# Patient Record
Sex: MALE | Race: OTHER | NOT HISPANIC OR LATINO | URBAN - METROPOLITAN AREA
[De-identification: names, ages, dates, MRNs, and addresses within clinical notes are randomized per-mention and may not be internally consistent; named-entity substitution may affect disease eponyms.]

---

## 2023-05-20 ENCOUNTER — EMERGENCY (EMERGENCY)
Facility: HOSPITAL | Age: 58
LOS: 0 days | Discharge: HOME | End: 2023-05-20
Attending: STUDENT IN AN ORGANIZED HEALTH CARE EDUCATION/TRAINING PROGRAM
Payer: COMMERCIAL

## 2023-05-20 VITALS
DIASTOLIC BLOOD PRESSURE: 80 MMHG | RESPIRATION RATE: 16 BRPM | SYSTOLIC BLOOD PRESSURE: 129 MMHG | HEART RATE: 54 BPM | OXYGEN SATURATION: 98 %

## 2023-05-20 VITALS
DIASTOLIC BLOOD PRESSURE: 84 MMHG | OXYGEN SATURATION: 99 % | SYSTOLIC BLOOD PRESSURE: 150 MMHG | RESPIRATION RATE: 18 BRPM | TEMPERATURE: 98 F | HEART RATE: 65 BPM

## 2023-05-20 DIAGNOSIS — S70.211A ABRASION, RIGHT HIP, INITIAL ENCOUNTER: ICD-10-CM

## 2023-05-20 DIAGNOSIS — F10.90 ALCOHOL USE, UNSPECIFIED, UNCOMPLICATED: ICD-10-CM

## 2023-05-20 DIAGNOSIS — S70.311A ABRASION, RIGHT THIGH, INITIAL ENCOUNTER: ICD-10-CM

## 2023-05-20 DIAGNOSIS — S01.01XA LACERATION WITHOUT FOREIGN BODY OF SCALP, INITIAL ENCOUNTER: ICD-10-CM
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
## 2023-05-20 DIAGNOSIS — S50.311A ABRASION OF RIGHT ELBOW, INITIAL ENCOUNTER: ICD-10-CM

## 2023-05-20 DIAGNOSIS — S50.312A ABRASION OF LEFT ELBOW, INITIAL ENCOUNTER: ICD-10-CM

## 2023-05-20 DIAGNOSIS — S01.81XA LACERATION WITHOUT FOREIGN BODY OF OTHER PART OF HEAD, INITIAL ENCOUNTER: ICD-10-CM
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
## 2023-05-20 DIAGNOSIS — S06.9XAA UNSPECIFIED INTRACRANIAL INJURY WITH LOSS OF CONSCIOUSNESS STATUS UNKNOWN, INITIAL ENCOUNTER: ICD-10-CM

## 2023-05-20 LAB
ALBUMIN SERPL ELPH-MCNC: 4.3 G/DL — SIGNIFICANT CHANGE UP (ref 3.5–5.2)
ALP SERPL-CCNC: 67 U/L — SIGNIFICANT CHANGE UP (ref 30–115)
ALT FLD-CCNC: 22 U/L — SIGNIFICANT CHANGE UP (ref 0–41)
ANION GAP SERPL CALC-SCNC: 11 MMOL/L — SIGNIFICANT CHANGE UP (ref 7–14)
APTT BLD: 31.7 SEC — SIGNIFICANT CHANGE UP (ref 27–39.2)
AST SERPL-CCNC: 34 U/L — SIGNIFICANT CHANGE UP (ref 0–41)
BASOPHILS # BLD AUTO: 0.05 K/UL — SIGNIFICANT CHANGE UP (ref 0–0.2)
BASOPHILS NFR BLD AUTO: 0.5 % — SIGNIFICANT CHANGE UP (ref 0–1)
BILIRUB SERPL-MCNC: 0.3 MG/DL — SIGNIFICANT CHANGE UP (ref 0.2–1.2)
BUN SERPL-MCNC: 13 MG/DL — SIGNIFICANT CHANGE UP (ref 10–20)
CALCIUM SERPL-MCNC: 9.1 MG/DL — SIGNIFICANT CHANGE UP (ref 8.4–10.5)
CHLORIDE SERPL-SCNC: 107 MMOL/L — SIGNIFICANT CHANGE UP (ref 98–110)
CO2 SERPL-SCNC: 27 MMOL/L — SIGNIFICANT CHANGE UP (ref 17–32)
CREAT SERPL-MCNC: 0.9 MG/DL — SIGNIFICANT CHANGE UP (ref 0.7–1.5)
EGFR: 101 ML/MIN/1.73M2 — SIGNIFICANT CHANGE UP
EOSINOPHIL # BLD AUTO: 0.08 K/UL — SIGNIFICANT CHANGE UP (ref 0–0.7)
EOSINOPHIL NFR BLD AUTO: 0.8 % — SIGNIFICANT CHANGE UP (ref 0–8)
ETHANOL SERPL-MCNC: 211 MG/DL — HIGH
GLUCOSE SERPL-MCNC: 101 MG/DL — HIGH (ref 70–99)
HCT VFR BLD CALC: 46.3 % — SIGNIFICANT CHANGE UP (ref 42–52)
HGB BLD-MCNC: 15.9 G/DL — SIGNIFICANT CHANGE UP (ref 14–18)
IMM GRANULOCYTES NFR BLD AUTO: 0.3 % — SIGNIFICANT CHANGE UP (ref 0.1–0.3)
INR BLD: 0.89 RATIO — SIGNIFICANT CHANGE UP (ref 0.65–1.3)
LACTATE SERPL-SCNC: 1.8 MMOL/L — SIGNIFICANT CHANGE UP (ref 0.7–2)
LIDOCAIN IGE QN: 23 U/L — SIGNIFICANT CHANGE UP (ref 7–60)
LYMPHOCYTES # BLD AUTO: 2.03 K/UL — SIGNIFICANT CHANGE UP (ref 1.2–3.4)
LYMPHOCYTES # BLD AUTO: 20.2 % — LOW (ref 20.5–51.1)
MCHC RBC-ENTMCNC: 32.9 PG — HIGH (ref 27–31)
MCHC RBC-ENTMCNC: 34.3 G/DL — SIGNIFICANT CHANGE UP (ref 32–37)
MCV RBC AUTO: 95.7 FL — HIGH (ref 80–94)
MONOCYTES # BLD AUTO: 0.8 K/UL — HIGH (ref 0.1–0.6)
MONOCYTES NFR BLD AUTO: 7.9 % — SIGNIFICANT CHANGE UP (ref 1.7–9.3)
NEUTROPHILS # BLD AUTO: 7.08 K/UL — HIGH (ref 1.4–6.5)
NEUTROPHILS NFR BLD AUTO: 70.3 % — SIGNIFICANT CHANGE UP (ref 42.2–75.2)
NRBC # BLD: 0 /100 WBCS — SIGNIFICANT CHANGE UP (ref 0–0)
PLATELET # BLD AUTO: 224 K/UL — SIGNIFICANT CHANGE UP (ref 130–400)
PMV BLD: 10.2 FL — SIGNIFICANT CHANGE UP (ref 7.4–10.4)
POTASSIUM SERPL-MCNC: 4 MMOL/L — SIGNIFICANT CHANGE UP (ref 3.5–5)
POTASSIUM SERPL-SCNC: 4 MMOL/L — SIGNIFICANT CHANGE UP (ref 3.5–5)
PROT SERPL-MCNC: 6.7 G/DL — SIGNIFICANT CHANGE UP (ref 6–8)
PROTHROM AB SERPL-ACNC: 10.1 SEC — SIGNIFICANT CHANGE UP (ref 9.95–12.87)
RBC # BLD: 4.84 M/UL — SIGNIFICANT CHANGE UP (ref 4.7–6.1)
RBC # FLD: 13 % — SIGNIFICANT CHANGE UP (ref 11.5–14.5)
SODIUM SERPL-SCNC: 145 MMOL/L — SIGNIFICANT CHANGE UP (ref 135–146)
TROPONIN T SERPL-MCNC: <0.01 NG/ML — SIGNIFICANT CHANGE UP
WBC # BLD: 10.07 K/UL — SIGNIFICANT CHANGE UP (ref 4.8–10.8)
WBC # FLD AUTO: 10.07 K/UL — SIGNIFICANT CHANGE UP (ref 4.8–10.8)

## 2023-05-20 PROCEDURE — 99285 EMERGENCY DEPT VISIT HI MDM: CPT

## 2023-05-20 PROCEDURE — 73070 X-RAY EXAM OF ELBOW: CPT | Mod: 50

## 2023-05-20 PROCEDURE — 99291 CRITICAL CARE FIRST HOUR: CPT | Mod: 25

## 2023-05-20 PROCEDURE — 72170 X-RAY EXAM OF PELVIS: CPT | Mod: 26

## 2023-05-20 PROCEDURE — 76705 ECHO EXAM OF ABDOMEN: CPT

## 2023-05-20 PROCEDURE — 85730 THROMBOPLASTIN TIME PARTIAL: CPT

## 2023-05-20 PROCEDURE — 93010 ELECTROCARDIOGRAM REPORT: CPT

## 2023-05-20 PROCEDURE — 84484 ASSAY OF TROPONIN QUANT: CPT

## 2023-05-20 PROCEDURE — 83605 ASSAY OF LACTIC ACID: CPT

## 2023-05-20 PROCEDURE — 70450 CT HEAD/BRAIN W/O DYE: CPT | Mod: 26,MA

## 2023-05-20 PROCEDURE — 70450 CT HEAD/BRAIN W/O DYE: CPT | Mod: MA

## 2023-05-20 PROCEDURE — 80307 DRUG TEST PRSMV CHEM ANLYZR: CPT

## 2023-05-20 PROCEDURE — 85610 PROTHROMBIN TIME: CPT

## 2023-05-20 PROCEDURE — 93005 ELECTROCARDIOGRAM TRACING: CPT

## 2023-05-20 PROCEDURE — 99053 MED SERV 10PM-8AM 24 HR FAC: CPT

## 2023-05-20 PROCEDURE — 99292 CRITICAL CARE ADDL 30 MIN: CPT

## 2023-05-20 PROCEDURE — 72170 X-RAY EXAM OF PELVIS: CPT

## 2023-05-20 PROCEDURE — 36415 COLL VENOUS BLD VENIPUNCTURE: CPT

## 2023-05-20 PROCEDURE — 99284 EMERGENCY DEPT VISIT MOD MDM: CPT

## 2023-05-20 PROCEDURE — 73070 X-RAY EXAM OF ELBOW: CPT | Mod: 26,50

## 2023-05-20 PROCEDURE — 71260 CT THORAX DX C+: CPT | Mod: 26,MA

## 2023-05-20 PROCEDURE — 85025 COMPLETE CBC W/AUTO DIFF WBC: CPT

## 2023-05-20 PROCEDURE — 72125 CT NECK SPINE W/O DYE: CPT | Mod: 26,MA

## 2023-05-20 PROCEDURE — 71045 X-RAY EXAM CHEST 1 VIEW: CPT

## 2023-05-20 PROCEDURE — 71045 X-RAY EXAM CHEST 1 VIEW: CPT | Mod: 26

## 2023-05-20 PROCEDURE — 74177 CT ABD & PELVIS W/CONTRAST: CPT | Mod: 26,MA

## 2023-05-20 PROCEDURE — 83690 ASSAY OF LIPASE: CPT

## 2023-05-20 PROCEDURE — 72125 CT NECK SPINE W/O DYE: CPT | Mod: MA

## 2023-05-20 PROCEDURE — 82962 GLUCOSE BLOOD TEST: CPT

## 2023-05-20 PROCEDURE — 71260 CT THORAX DX C+: CPT | Mod: MA

## 2023-05-20 PROCEDURE — 74177 CT ABD & PELVIS W/CONTRAST: CPT | Mod: MA

## 2023-05-20 PROCEDURE — 80053 COMPREHEN METABOLIC PANEL: CPT
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
## 2023-05-20 NOTE — ED PROVIDER NOTE - NSFOLLOWUPCLINICS_GEN_ALL_ED_FT
[facility]  Medicine  [address], [place]   Phone: [phone]  Fax:   Follow Up Time: 4-6 Days

## 2023-05-20 NOTE — ED ADULT NURSE NOTE - NSFALLRISKINTERV_ED_ALL_ED
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
## 2023-05-20 NOTE — ED PROVIDER NOTE - PHYSICAL EXAMINATION
PHYSICAL EXAM: I have reviewed current vital signs.  GENERAL: NAD, well-nourished; well-developed.  HEAD:  Normocephalic.  EYES: EOMI, PERRL, conjunctiva and sclera clear.  ENT: MMM, no erythema/exudates.  NECK: Supple, no JVD.  CHEST/LUNG: Clear to auscultation bilaterally; no wheezes, rales, or rhonchi.  HEART: Regular rate and rhythm, normal S1 and S2; no murmurs, rubs, or gallops.  ABDOMEN: Soft, nontender, nondistended.  EXTREMITIES:  2+ peripheral pulses; no clubbing, cyanosis, or edema.  PSYCH: Cooperative, appropriate, normal mood and affect.  NEUROLOGY: A&O x 3. Motor 5/5. Sensory intact. No focal neurological deficits.   SKIN: Abrasion on left forehead and right hip. Superficial laceration on left forehead.

## 2023-05-20 NOTE — ED PROVIDER NOTE - PATIENT PORTAL LINK FT
You can access the FollowMyHealth Patient Portal offered by [facility] by registering at the following website: [email or web address]. By joining [employer] [facility]’s FollowMyHealth portal, you will also be able to view your health information using other applications (apps) compatible with our system.
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
## 2023-05-20 NOTE — ED PROVIDER NOTE - CARE PROVIDER_API CALL
Obie Jones)  Critical Care Medicine; Internal Medicine; Pulmonary Disease; Sleep Medicine  [address] [place] [address]  Phone: [phone]  Fax: [phone]  Follow Up Time: Routine
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
## 2023-05-20 NOTE — ED ADULT NURSE NOTE - DRUG PRE-SCREENING (DAST -1)
Encounter Date: 6/12/2022    SCRIBE #1 NOTE: I, Leela Tuttle, am scribing for, and in the presence of,  Sarah Garcia DO. I have scribed the following portions of the note - the EKG reading. Other sections scribed: HPI, ROS, PE.       History     Chief Complaint   Patient presents with    Abdominal Pain     Pt to ER via [facility] for abd pain.  Upper abdomen.  Started 2 days ago.  Denies n/v, but is having diarrhea     72 year old female with history of HTN, DM, and arthritis presents to ED complaining of abdominal pain. Pt reports sharp constant abdominal pain and diarrhea that began 2 days ago.  She also reports rash under breast and bedsore on her bottom. Pt denies N/V, or fever.    The history is provided by the patient.   Abdominal Pain  The current episode started several days ago. The abdominal pain is generalized. The severity of the abdominal pain is 9/10. Exacerbated by: drinking water. The other symptoms of the illness include diarrhea. The other symptoms of the illness do not include fever, shortness of breath, nausea, vomiting or dysuria.   The diarrhea began 2 days ago.   Symptoms associated with the illness do not include chills, diaphoresis or hematuria.     Review of patient's allergies indicates:   Allergen Reactions    Adhesive Other (See Comments)     Removes her skin.      Morphine Other (See Comments)     Makes her crazy     Past Medical History:   Diagnosis Date    Age-related nuclear cataract of both eyes 5/26/2022    Anemia 5/26/2022    Benign essential HTN 12/27/2018    Chronic cholecystitis 5/26/2022    Class 3 severe obesity due to excess calories with serious comorbidity and body mass index (BMI) of 40.0 to 44.9 in adult 12/28/2018    Closed fracture of distal end of right femur 12/24/2018    Closed fracture of right hip requiring operative repair, with routine healing, subsequent encounter 12/28/2018    Coronary artery disease involving native coronary artery of native heart  without angina pectoris 5/26/2022    Diabetes mellitus type 2 in obese 12/27/2018    Diabetic polyneuropathy 5/26/2022    Erosive osteoarthritis 5/26/2022    Gastroesophageal reflux disease 5/26/2022    Hypertension 5/26/2022    Low vitamin D level 5/26/2022    Mixed hyperlipidemia 5/26/2022    Osteoporosis 12/28/2018    history of multiple fractures and arthritis history of multiple fractures and arthritis    Other hyperlipidemia 12/28/2018    Peripheral arterial disease 5/26/2022    Physical deconditioning 5/9/2020    Postmenopausal 5/26/2022    Seasonal allergic rhinitis 5/26/2022    STEMI (ST elevation myocardial infarction) 5/26/2022    Tobacco dependency 5/26/2022     No past surgical history on file.  No family history on file.     Review of Systems   Constitutional: Negative for chills, diaphoresis and fever.   HENT: Negative for congestion and sore throat.    Eyes: Negative for visual disturbance.   Respiratory: Negative for cough and shortness of breath.    Cardiovascular: Negative for chest pain and palpitations.   Gastrointestinal: Positive for abdominal pain and diarrhea. Negative for nausea and vomiting.   Genitourinary: Negative for dysuria and hematuria.   Skin: Negative for rash.   Neurological: Negative for syncope, weakness, numbness and headaches.   All other systems reviewed and are negative.      Physical Exam     Initial Vitals [06/12/22 1050]   BP Pulse Resp Temp SpO2   130/80 110 18 98.2 °F (36.8 °C) 98 %      MAP       --         Physical Exam    Nursing note and vitals reviewed.  Constitutional: She appears well-developed and well-nourished. She is not diaphoretic. She does not appear ill. No distress.   Appears uncomfortable   HENT:   Head: Normocephalic and atraumatic.   Right Ear: External ear normal.   Left Ear: External ear normal.   Mouth/Throat: Oropharynx is clear and moist. Mucous membranes are dry.   Eyes: Conjunctivae and EOM are normal. Pupils are equal, round,  and reactive to light.   Neck: Neck supple. No tracheal deviation present.   Cardiovascular: Regular rhythm, normal heart sounds and intact distal pulses. Tachycardia present.    No murmur heard.  Pulmonary/Chest: Breath sounds normal. No respiratory distress. She has no wheezes. She has no rhonchi. She has no rales.   Abdominal: Abdomen is soft. She exhibits no distension. Bowel sounds are decreased. There is generalized abdominal tenderness.   No right CVA tenderness.  No left CVA tenderness.   Musculoskeletal:         General: Normal range of motion.      Cervical back: Neck supple.     Neurological: She is alert and oriented to person, place, and time. She has normal strength. No cranial nerve deficit or sensory deficit. GCS score is 15. GCS eye subscore is 4. GCS verbal subscore is 5. GCS motor subscore is 6.   Skin: Skin is warm and dry. Capillary refill takes less than 2 seconds. No rash noted. No pallor.   Candidal rash in vulva area and under both breasts  Stage 2 sacral ulcer   Psychiatric: She has a normal mood and affect. Her behavior is normal.         ED Course   Procedures  Labs Reviewed   COMPREHENSIVE METABOLIC PANEL - Abnormal; Notable for the following components:       Result Value    Carbon Dioxide 18 (*)     Glucose Level 146 (*)     Blood Urea Nitrogen 9.2 (*)     Albumin Level 2.8 (*)     Globulin 4.0 (*)     Albumin/Globulin Ratio 0.7 (*)     All other components within normal limits   URINALYSIS, REFLEX TO URINE CULTURE - Abnormal; Notable for the following components:    Ketones, UA Trace (*)     Nitrites, UA Positive (*)     Leukocyte Esterase, UA Trace (*)     All other components within normal limits   CBC WITH DIFFERENTIAL - Abnormal; Notable for the following components:    WBC 16.3 (*)     MCHC 32.1 (*)     Platelet 468 (*)     Neut # 12.8 (*)     IG# 0.06 (*)     All other components within normal limits   URINALYSIS, MICROSCOPIC - Abnormal; Notable for the following components:     WBC, UA 6 (*)     Bacteria, UA 1+ (*)     All other components within normal limits          LIPASE - Normal   TROPONIN I - Normal   SARS-COV-2 RNA AMPLIFICATION, QUAL - Normal            EKG Readings: (Independently Interpreted)   Initial Reading: No STEMI. Rhythm: Sinus Tachycardia. Heart Rate: 122. Ectopy: No Ectopy. Other Impression: inferior lateral T wave inversions    Time 1056     ECG Results          EKG 12-lead (Final result)  Result time 06/13/22 16:46:20    Final result by Interface, Lab In [facility] (06/13/22 16:46:20)                 Narrative:    Test Reason : I21.3,    Vent. Rate : 122 BPM     Atrial Rate : 122 BPM     P-R Int : 146 ms          QRS Dur : 076 ms      QT Int : 342 ms       P-R-T Axes : 076 053 238 degrees     QTc Int : 487 ms    Sinus tachycardia  Low voltage QRS  ST and T wave abnormality, consider inferior ischemia  ST and T wave abnormality, consider anterolateral ischemia  Abnormal ECG  No previous ECGs available  Confirmed by Catracho Luciano MD ([id number]) on 6/13/2022 4:46:01 PM    Referred By: [email or web address]   SELF           Confirmed By:Catracho Luciano MD                            Imaging Results          CT Abdomen Pelvis  Without Contrast (Final result)  Result time 06/12/22 11:59:32    Final result by Jessica Petty MD (06/12/22 11:59:32)                 Impression:      Few scattered dilated loops of small bowel without an abrupt transition point, may represent an ileus.  Otherwise no acute abnormality of the abdomen or pelvis.      Electronically signed by: Jessica Petty  Date:    06/12/2022  Time:    11:59             Narrative:    EXAMINATION:  CT ABDOMEN PELVIS WITHOUT CONTRAST    CLINICAL HISTORY:  Abdominal pain, acute, nonlocalized;    TECHNIQUE:  CT imaging was performed of the abdomen and pelvis without intravenous contrast. Dose length product is 560 mGycm. Automatic exposure control, adjustment of mA/kV or iterative reconstruction technique was used to limit radiation  dose.    COMPARISON:  CT abdomen pelvis dated 01/12/2022    FINDINGS:  Assessment of the visceral organs and vasculature is limited by the lack of IV contrast.    Liver/biliary: No concerning hepatic findings. The gallbladder is not identified.    Pancreas: Dystrophic pancreatic calcifications are suggestive of chronic pancreatitis.    Spleen: Normal.    Adrenals: Normal.    Kidneys, ureters and bladder: There are bilateral nonobstructing renal calculi.  There is an exophytic left renal cyst.  There is no hydronephrosis.  The bladder is within normal limits.    Reproductive organs: No pelvic masses.    Stomach/bowel: There are a few scattered mildly dilated loops of small bowel without an abrupt transition point.  There is no definite focal bowel wall thickening.    Lymph nodes: No pathologically enlarged lymph node identified with noncontrast technique.    Peritoneum: No ascites or free air.    Vessels: Extensive vascular calcifications without aneurysm.    Abdominal wall: Normal.    Lung bases: No consolidation or pleural effusion.    Bones: Chronic thoracolumbar compression deformities.                                 Medications               sodium chloride 0.9% bolus 1,000 mL (0 mLs Intravenous Stopped 6/12/22 1234)   morphine injection 4 mg (4 mg Intravenous Given 6/12/22 1134)   ondansetron injection 4 mg (4 mg Intravenous Given 6/12/22 1133)   lactated ringers bolus 1,000 mL (0 mLs Intravenous Stopped 6/12/22 1431)   cefTRIAXone (ROCEPHIN) 1 g in dextrose 5 % in water (D5W) 5 % 50 mL IVPB (MB+) (0 g Intravenous Stopped 6/12/22 1529)   fluconazole tablet 200 mg (200 mg Oral Given 6/12/22 1718)     Medical Decision Making:   Initial Assessment:   73 yo female with generalized abdominal pain and diarrhea, weakness and unable to get out of bed x 2 days   Independently Interpreted Test(s):   I have ordered and independently interpreted EKG Reading(s) - see prior notes  Clinical Tests:   Lab Tests: Ordered and  Reviewed       <> Summary of Lab: Elevated WBC   Low bicarb, albumin   Infected urine   Radiological Study: Reviewed and Ordered  Medical Tests: Ordered and Reviewed  ED Management:  Given IVF bolus   Morphine and Zofran for pain   CT with chronic pancreatitis and lipase normal but H&P consistent with pancreatitis   Will admit for continued hydration and pain control  Given Rocephin IV for UTI             Attending Attestation:           Physician Attestation for Scribe:  Physician Attestation Statement for Scribe #1: I, Sarah Garcia, DO, reviewed documentation, as scribed by Leela Tuttle in my presence, and it is both accurate and complete.             ED Course as of 06/15/22 0904   Sun Jun 12, 2022   1308 WBC(!): 16.3  Elevated  [KM]   1452 Leukocytes, UA(!): Trace [KM]   1452 NITRITE UA(!): Positive [KM]   1452 Ketones, UA(!): Trace [KM]   1452 Bacteria, UA(!): 1+  Ketonuria and UTI, will give rocephin  [KM]   1518 Spoke with hospitalist for admission [KM]      ED Course User Index  [KM] Sarah Garcia MD             Clinical Impression:   Final diagnoses:  [K85.90, K86.1] Acute on chronic pancreatitis (Primary)  [N39.0] Acute UTI  [R10.13] Epigastric abdominal pain  [N30.80] Bacterial cystitis  [E88.09] Hypoalbuminemia  [L89.152] Sacral decubitus ulcer, stage II  [B37.3] Vulvovaginal candidiasis  [B37.2] Intertriginous candidiasis  [R53.1] General weakness          ED Disposition Condition    Admit               Sarah Garcia MD  06/15/22 0905     Statement Selected

## 2023-05-26 PROCEDURE — 76705 ECHO EXAM OF ABDOMEN: CPT | Mod: 26
